# Patient Record
Sex: FEMALE | Race: WHITE
[De-identification: names, ages, dates, MRNs, and addresses within clinical notes are randomized per-mention and may not be internally consistent; named-entity substitution may affect disease eponyms.]

---

## 2021-06-22 ENCOUNTER — HOSPITAL ENCOUNTER (OUTPATIENT)
Dept: HOSPITAL 53 - M SDC | Age: 26
Discharge: HOME | End: 2021-06-22
Attending: OBSTETRICS & GYNECOLOGY
Payer: COMMERCIAL

## 2021-06-22 VITALS — WEIGHT: 194 LBS | HEIGHT: 59 IN | BODY MASS INDEX: 39.11 KG/M2

## 2021-06-22 VITALS — SYSTOLIC BLOOD PRESSURE: 113 MMHG | DIASTOLIC BLOOD PRESSURE: 53 MMHG

## 2021-06-22 DIAGNOSIS — R73.03: ICD-10-CM

## 2021-06-22 DIAGNOSIS — Z79.84: ICD-10-CM

## 2021-06-22 DIAGNOSIS — N73.6: ICD-10-CM

## 2021-06-22 DIAGNOSIS — E03.9: ICD-10-CM

## 2021-06-22 DIAGNOSIS — N91.5: Primary | ICD-10-CM

## 2021-06-22 DIAGNOSIS — N85.4: ICD-10-CM

## 2021-06-22 DIAGNOSIS — N99.71: ICD-10-CM

## 2021-06-22 DIAGNOSIS — K66.8: ICD-10-CM

## 2021-06-22 DIAGNOSIS — Z79.899: ICD-10-CM

## 2021-06-22 DIAGNOSIS — N88.2: ICD-10-CM

## 2021-06-22 LAB
B-HCG SERPL QL: NEGATIVE
HCT VFR BLD AUTO: 43.2 % (ref 36–47)
HGB BLD-MCNC: 14 G/DL (ref 12–15.5)
MCH RBC QN AUTO: 28.4 PG (ref 27–33)
MCHC RBC AUTO-ENTMCNC: 32.4 G/DL (ref 32–36.5)
MCV RBC AUTO: 87.6 FL (ref 80–96)
PLATELET # BLD AUTO: 406 10^3/UL (ref 150–450)
RBC # BLD AUTO: 4.93 10^6/UL (ref 4–5.4)
WBC # BLD AUTO: 12 10^3/UL (ref 4–10)

## 2021-06-22 PROCEDURE — 88305 TISSUE EXAM BY PATHOLOGIST: CPT

## 2021-06-22 PROCEDURE — 85027 COMPLETE CBC AUTOMATED: CPT

## 2021-06-22 PROCEDURE — 36415 COLL VENOUS BLD VENIPUNCTURE: CPT

## 2021-06-22 PROCEDURE — 58558 HYSTEROSCOPY BIOPSY: CPT

## 2021-06-22 PROCEDURE — 84703 CHORIONIC GONADOTROPIN ASSAY: CPT

## 2021-06-22 NOTE — RO
OPERATIVE NOTE



DATE OF OPERATION: 06/22/2021



PREOPERATIVE DIAGNOSIS: Oligomenorrhea, obesity, PCOS and cervical stenosis.



POSTOPERATIVE DIAGNOSIS: Oligomenorrhea, obesity, PCOS, cervical stenosis,

severe uterine retroversion.



PROCEDURES: Diagnostic hysteroscopy, dilatation and curettage, and diagnostic

laparoscopy. 



SURGEON: Dash Diop DO



ASSISTANT: Dr. Jerald Hansen MD



IV FLUIDS: 1300 mL LR.



URINE OUTPUT: 100 mL. 



EBL: 2 mL.



ANTIBIOTICS: 2 gm Ancef. 



ANESTHESIA: 



OPERATIVE FINDINGS: Severely retroverted uterus with stenotic internal os.

Uterine perforation identified. Upon laparoscopy there was noted to be omental

adhesions to the anterior abdominal wall.  The uterus was noted to be severely

retroverted almost completely folded in half.  The bilateral fallopian tubes

appeared normal. The right ovary was encased in filmy adhesions.   Mildly 
enlarged left

ovary. Uterine perforation was identified at the anterior lower uterine segment.
There was severe scarring from the bladder to the anterior part of the uterus 
and the perforation occurred at the scar between the bladder and the uterus. 
There was no bladder injury. The bladder was back-filled confirming that it was 
intact and there was no leakage.



DESCRIPTION OF PROCEDURE: The risks, benefits, indications, and alternatives of 
the

procedure were reviewed with the patient and informed consent was obtained. The

patient was taken to the operating room where general anesthesia was obtained

without difficulty. The patient was then placed in the lithotomy position using

Clay stirrups. An examination under anesthesia was then performed and was

significant for a retroverted uterus. The patient was then prepped and draped

in usual sterile fashion and the bladder was drained using in-and-out catheter.



A sterile speculum was then placed in the patient's vagina and the cervix was

visualized. A single toothed tenaculum was used to grasp the anterior lip of the

cervix. A uterine sound was gently placed through the cervix.  This met

resistance at the internal cervical os. Dilation was then attempted to size 15 
N[place]

with a  Hand dilator. The uterine sound still could not be adequately placed 
within the

uterus secondary to resistance. The hysteroscope was then primed. The 
hysteroscope was then attempted to be advanced into endocervical canal. This 
could not be accomplished secondary to

significant resistance. At this point Dr. Hansen was called into the room for 
assistance.  A hysteroscopy was again attempted and a uterine perforation was 
identified. The patient was

then prepped for laparoscopy to assess the degree of perforation and if there

was any injury.



A don catheter was placed. The patient was re-prepped and redraped. 
Rescrubbing

was performed. New gloves were put on. Attention was turned to the patient's

abdomen where a 5 mm skin incision was made in the inferior aspect of the

umbilicus after injection of Marcaine.



A 5 mm trocar and sleeve were then carefully introduced into the peritoneal

cavity under direct visualization at a 90-degree angle while tenting up the

abdominal wall. Intraperitoneal placement was confirmed under direct

visualization and entry pressure noted to be less than 5 mmHg. A 
pneumoperitoneum

was obtained with several liters of CO2 gas. Upon entry into the peritoneal

cavity, structures immediately below the incision were inspected and found to be

free of injury.



A survey of the patient's abdomen and pelvis was notable for normal appearing 
liver.

There were omental adhesions to the anterior abdominal wall. The stomach 
appeared

normal. The uterus was massively retroverted, nearly folded in on itself.

The fallopian tubes were normal bilaterally. The right ovary was encased in 
filmy

adhesions and was enlarged in appearance. The left ovary was slightly enlarged 
but appeared normal. There

was severe scarring from the bladder to the lower anterior portion of the 
uterus.

The uterine perforation was identified within the scarring at the interface

between the bladder and the uterus. There was no bleeding whatsoever from this

perforation and it was small. There was no injury to anywhere near the bowel or

any vasculature whatsoever. The bladder was then back-filled to confirm that

the bladder remained intact.  There was no leaking of urine seen during the 
backfill. This confirmed that

the bladder was intact and without injury. Suction irrigation was then

performed to remove the hysteroscopic fluid that came into the abdomen

through the perforation. Again, inspection of the perforation revealed

excellent hemostasis and no need for repair. The gas was then turned off and all
CO2

was removed from the patient's abdomen. The patient was given 2 grams of Ancef 
due

to the uterine perforation for infection prophylaxis. Again, no bleeding was 
seen. All ports were then

removed under direct visualization and there was no bleeding seen from the

trocar sites. The incisions were closed with 4-0 Monocryl suture and then

covered with Dermabond.



All instruments were then removed from the patient's vagina.  The don

catheter was removed. The cervix was noted to be hemostatic. At

the completion of the case, the sponge, instrument, and needle counts were 
correct

x2. The patient tolerated the procedure well. She was cleansed and dried, taken

out of lithotomy position, awakened from anesthesia, and taken to PACU in stable

condition.

JULISA